# Patient Record
(demographics unavailable — no encounter records)

---

## 2024-10-10 NOTE — ASSESSMENT
[FreeTextEntry1] : 41-year-old male presenting for acute right epididymoorchitis  Scrotal ultrasound revealed right epididymoorchitis. 1.4 cm right epididymal head cyst Urinalysis unremarkable. Gonorrhea/chlamydia negative. Urine culture not performed given clean UA  -Conservative measures.  Anti-inflammatories prn, scrotal support, ice packs -Complete course of empiric antibiotics -RTC prn Let me know if any worsening of pain or fevers

## 2024-10-10 NOTE — HISTORY OF PRESENT ILLNESS
[FreeTextEntry1] : 41-year-old male presenting for epididymitis  Recently presented to the hospital for testicular pain 10/8/2024 At that time had right testicular pain x 1 day.  Noticed that after doing a lot of heavy lifting Scrotal ultrasound revealed right epididymoorchitis.  1.4 cm right epididymal head cyst Urinalysis unremarkable.  Gonorrhea/chlamydia negative.  Urine culture not performed given clean UA Was discharged home on course of cefuroxime  Feels much better now Never had something like this before No dysuria or hematuria Sometimes difficult urinating and weak stream Nocturia x0-1 No discharge. No new sexual partners

## 2024-10-10 NOTE — PHYSICAL EXAM
[Chaperone Present] : A chaperone was present in the examining room during all aspects of the physical examination [de-identified] : Uncircumcised phallus, no phimosis, right testicle and epididymis indurated and tender to palpation, left testicle normal nontender [FreeTextEntry2] : Anabell